# Patient Record
Sex: FEMALE | Race: WHITE | Employment: UNEMPLOYED | URBAN - METROPOLITAN AREA
[De-identification: names, ages, dates, MRNs, and addresses within clinical notes are randomized per-mention and may not be internally consistent; named-entity substitution may affect disease eponyms.]

---

## 2024-03-31 ENCOUNTER — HOSPITAL ENCOUNTER (OUTPATIENT)
Age: 74
Discharge: HOME | End: 2024-03-31
Payer: MEDICARE

## 2024-03-31 VITALS
SYSTOLIC BLOOD PRESSURE: 178 MMHG | OXYGEN SATURATION: 99 % | RESPIRATION RATE: 18 BRPM | DIASTOLIC BLOOD PRESSURE: 89 MMHG | TEMPERATURE: 98 F | HEART RATE: 69 BPM

## 2024-03-31 DIAGNOSIS — M25.461 PAIN AND SWELLING OF RIGHT KNEE: Primary | ICD-10-CM

## 2024-03-31 DIAGNOSIS — M25.561 PAIN AND SWELLING OF RIGHT KNEE: Primary | ICD-10-CM

## 2024-03-31 RX ORDER — ATORVASTATIN CALCIUM 10 MG/1
1 TABLET, FILM COATED ORAL DAILY
COMMUNITY
Start: 2023-09-05

## 2024-03-31 NOTE — ED INITIAL ASSESSMENT (HPI)
Bruise on her R knee that was there 1 week ago, and today during [employer] she [id number] to kneel and stand up a few times during service, and when she got home she noticed a big water filled like sac on her R knee cap.

## 2024-03-31 NOTE — ED PROVIDER NOTES
Patient Seen in: [facility]      History     Chief Complaint   Patient presents with    Knee Pain     Stated Complaint: Knee pain    Subjective:   HPI    73-year-old female presents for evaluation of sudden onset of swelling to her right knee for 1 day.  Patient states she is currently watching her 1-year-old grandchild.  States she has been crawling on the ground with the grandchild and noticed some bruising to the area.  When she was at [employer] kneeling up and down today she noticed sudden onset of swelling over the affected area.  Patient takes a baby aspirin, not on any other blood thinners.  Denies any fevers, N/V,  new numbness or tingling in the leg, weakness in the leg.    Objective:   History reviewed. No pertinent past medical history.           History reviewed. No pertinent surgical history.             Social History     Socioeconomic History    Marital status:    Tobacco Use    Smoking status: Former   Vaping Use    Vaping Use: Never used   Substance and Sexual Activity    Alcohol use: Yes    Drug use: No              Review of Systems    Positive for stated complaint: Knee pain  Other systems are as noted in HPI.  Constitutional and vital signs reviewed.      All other systems reviewed and negative except as noted above.    Physical Exam     ED Triage Vitals [03/31/24 1416]   BP (!) 178/89   Pulse 69   Resp 18   Temp 97.7 °F (36.5 °C)   Temp src Temporal   SpO2 99 %   O2 Device None (Room air)       Current:BP (!) 178/89   Pulse 69   Temp 97.7 °F (36.5 °C) (Temporal)   Resp 18   SpO2 99%         Physical Exam  Vitals and nursing note reviewed.   Constitutional:       General: She is not in acute distress.     Appearance: Normal appearance. She is not ill-appearing or toxic-appearing.   HENT:      Head: Normocephalic.      Nose: Nose normal.      Mouth/Throat:      Mouth: Mucous membranes are moist.   Eyes:      Conjunctiva/sclera: Conjunctivae normal.   Cardiovascular:       Rate and Rhythm: Normal rate.   Pulmonary:      Effort: Pulmonary effort is normal. No respiratory distress.   Musculoskeletal:         General: Normal range of motion.      Cervical back: Normal range of motion.      Comments: Mild swelling over the R patellar tendon; No overlying erythema/warmth;  No appreciated abrasions/lacerations. No bony tenderness to the right knee. Full ROM in the right knee. Extensor mechanism intact. No calf ttp/swelling. Pt ambulating without difficulty.    Skin:     General: Skin is warm.   Neurological:      General: No focal deficit present.      Mental Status: She is alert.   Psychiatric:         Mood and Affect: Mood normal.         Behavior: Behavior normal.             ED Course   Labs Reviewed - No data to display                 MDM                                      Medical Decision Making  73-year-old female with swelling to her right knee.  Afebrile, vital signs are stable.  No systemic symptoms.  Differential bursitis versus septic bursitis versus cellulitis.  Exam consistent with infrapatellar bursitis.  No signs of infection or septic bursitis at this time.  Education provided and advised supportive care.  Ace wrap provided.  Advise close PCP and/or Ortho follow-up for further evaluation and management as needed. Return precautions advised.     Disposition and Plan     Clinical Impression:  1. Pain and swelling of right knee         Disposition:  Discharge  3/31/2024  3:09 pm    Follow-up:  Andry Thomas MD  [address] [place] [id number]  [phone]      Primary Care Provider          Medications Prescribed:  Discharge Medication List as of 3/31/2024  3:26 PM

## 2025-07-24 ENCOUNTER — APPOINTMENT (OUTPATIENT)
Dept: GENERAL RADIOLOGY | Age: 75
End: 2025-07-24
Attending: NURSE PRACTITIONER
Payer: MEDICARE

## 2025-07-24 ENCOUNTER — HOSPITAL ENCOUNTER (OUTPATIENT)
Age: 75
Discharge: HOME | End: 2025-07-24
Payer: MEDICARE

## 2025-07-24 ENCOUNTER — APPOINTMENT (OUTPATIENT)
Dept: ULTRASOUND IMAGING | Age: 75
End: 2025-07-24
Attending: NURSE PRACTITIONER
Payer: MEDICARE

## 2025-07-24 VITALS
DIASTOLIC BLOOD PRESSURE: 88 MMHG | HEART RATE: 53 BPM | TEMPERATURE: 98 F | SYSTOLIC BLOOD PRESSURE: 160 MMHG | RESPIRATION RATE: 18 BRPM | OXYGEN SATURATION: 98 %

## 2025-07-24 DIAGNOSIS — S99.921A INJURY OF RIGHT FOOT, INITIAL ENCOUNTER: Primary | ICD-10-CM

## 2025-07-24 DIAGNOSIS — M79.661 RIGHT CALF PAIN: ICD-10-CM

## 2025-07-24 PROCEDURE — 93971 EXTREMITY STUDY: CPT | Performed by: NURSE PRACTITIONER

## 2025-07-24 PROCEDURE — L1906 AFO MULTILIG ANK SUP PRE OTS: HCPCS | Performed by: NURSE PRACTITIONER

## 2025-07-24 PROCEDURE — A6448 LT COMPRES BAND <3"/YD: HCPCS | Performed by: NURSE PRACTITIONER

## 2025-07-24 PROCEDURE — 99203 OFFICE O/P NEW LOW 30 MIN: CPT | Performed by: NURSE PRACTITIONER

## 2025-07-24 PROCEDURE — 73630 X-RAY EXAM OF FOOT: CPT | Performed by: NURSE PRACTITIONER

## 2025-07-24 RX ORDER — LORAZEPAM 0.5 MG/1
1 TABLET ORAL DAILY PRN
COMMUNITY

## 2025-07-24 RX ORDER — CYCLOBENZAPRINE HCL 10 MG
10 TABLET ORAL
COMMUNITY
Start: 2021-07-08

## 2025-07-24 RX ORDER — FLUTICASONE PROPIONATE 50 MCG
2 SPRAY, SUSPENSION (ML) NASAL DAILY
COMMUNITY
Start: 2025-06-16

## 2025-07-24 RX ORDER — ASCORBIC ACID 500 MG
TABLET ORAL
COMMUNITY

## 2025-07-24 RX ORDER — MELATONIN: COMMUNITY

## 2025-07-24 NOTE — DISCHARGE INSTRUCTIONS
Ace wrap during today, take off at night  Ice over Ace wrap twice a time 3 times per day  Ankle brace for comfort  Please follow-up with orthopedics as discussed    To schedule an appointment with the Orthopedic and Sports Medicine department; please text or call [phone] and choose option 3 when prompted. If your insurance requires a referral, please contact your primary care provider first.

## 2025-07-24 NOTE — ED PROVIDER NOTES
Patient Seen in: [facility]        History  Chief Complaint   Patient presents with    Foot Pain     Stated Complaint: right foot pain    Subjective:   HPI     Elin Schwarz is a 75 year old female who presents with right foot and right calf pain following an injury 4 days ago.    She injured her foot while checking on her tomatoes, slipping from cement onto grass. She did not fall but rolled her foot. Initially, the pain was significant when walking after sitting. The pain has improved slightly, but significant calf pain developed, likely due to favoring the injured foot. The foot injury occurred on Monday, with calf pain starting a day later. There is no numbness or tingling beyond her usual neuropathy symptoms and no pops or snaps in the back of her foot at onset of injury. Pain is primarily in the foot, extending into the calf when bending the foot. She has no history of blood clots. No chest pain, or shortness of breath.        Objective:     Past Medical History:    Hyperlipidemia    Scoliosis    Thyroid disease              History reviewed. No pertinent surgical history.             Social History     Socioeconomic History    Marital status:    Tobacco Use    Smoking status: Former   Vaping Use    Vaping status: Never Used   Substance and Sexual Activity    Alcohol use: Yes    Drug use: No     Social Drivers of Health      Received from [facility]              Review of Systems    Positive for stated complaint: right foot pain  Other systems are as noted in HPI.  Constitutional and vital signs reviewed.      All other systems reviewed and negative except as noted above.                  Physical Exam    ED Triage Vitals [07/24/25 1218]   BP (!) 160/93   Pulse 63   Resp 18   Temp 98.4 °F (36.9 °C)   Temp src Oral   SpO2 97 %   O2 Device None (Room air)       Current Vitals:   Vital Signs  BP: 160/88  Pulse: 53  Resp: 18  Temp: 98.4 °F (36.9 °C)  Temp src:  Oral    Oxygen Therapy  SpO2: 98 %  O2 Device: None (Room air)            Physical Exam      CHEST: Clear to auscultation bilaterally. No wheezes, rhonchi, or crackles. CARDIOVASCULAR: Normal heart rate and rhythm. S1 and S2 normal without murmurs.   EXTREMITIES: Normal pedal pulses. Tenderness in the lateral aspect of right dorsal foot. There is calf tenderness. No cyanosis or edema. Normal ROM in the foot and ankle. Negative Dalal test.    ED Course  Labs Reviewed - No data to display    MDM    Medical Decision Making  Differentials considered: Foot fracture versus foot sprain versus Achilles tendon injury versus DVT versus other    HPI and exam consistent with right foot sprain.  There is no Achilles tendon tenderness, with a negative Dalal test, low suspicion of Achilles tendon etiology.  Left lower extremity ultrasound negative for DVT.  Patient placed in Ace wrap, and ankle brace, supportive care discussed.  Advised close follow-up with orthopedics, phone number provided.  Patient verbalized understanding and agreeable to plan of care.     Amount and/or Complexity of Data Reviewed  Radiology: ordered and independent interpretation performed. Decision-making details documented in ED Course.     Details: I personally reviewed right lower extremity ultrasound: No DVT  I personally reviewed right foot x-ray: No fracture          Disposition and Plan     Clinical Impression:  1. Injury of right foot, initial encounter    2. Right calf pain         Disposition:  Discharge  7/24/2025  2:03 pm    Follow-up:  No follow-up provider specified.        Medications Prescribed:  Discharge Medication List as of 7/24/2025  2:12 PM                Supplementary Documentation:

## 2025-07-24 NOTE — ED INITIAL ASSESSMENT (HPI)
Here for eval of R foot and calf pain after rolling foot on uneven surface 3 days ago. Foot pain occurred hours later. Calf pain noticed the next day. No redness or swelling appreciated.